# Patient Record
(demographics unavailable — no encounter records)

---

## 2019-02-15 NOTE — NUR
PATIENT WAS SEEN BY MD.  XRAYS COMPLETE.   ACE WRAP APPLIED TO ANKLE PER MD 
INSTRUCTION.     DC, RX AND FOLLOW UP INSTRUCTIONS GIVEN AND EXPLAINED TO 
PATIENT WHO STATES SHE UNDERSTANDS ALL INSTRUCTIONS